# Patient Record
Sex: FEMALE | Race: WHITE | Employment: OTHER | URBAN - METROPOLITAN AREA
[De-identification: names, ages, dates, MRNs, and addresses within clinical notes are randomized per-mention and may not be internally consistent; named-entity substitution may affect disease eponyms.]

---

## 2024-11-24 ENCOUNTER — HOSPITAL ENCOUNTER (OUTPATIENT)
Age: 74
Discharge: OTHER INSTITUTION | End: 2024-11-24
Payer: MEDICARE

## 2024-11-24 VITALS
TEMPERATURE: 98 F | OXYGEN SATURATION: 99 % | SYSTOLIC BLOOD PRESSURE: 134 MMHG | RESPIRATION RATE: 20 BRPM | HEART RATE: 86 BPM | DIASTOLIC BLOOD PRESSURE: 65 MMHG

## 2024-11-24 DIAGNOSIS — R11.2 NAUSEA AND VOMITING, UNSPECIFIED VOMITING TYPE: ICD-10-CM

## 2024-11-24 DIAGNOSIS — R07.9 CHEST PAIN OF UNCERTAIN ETIOLOGY: Primary | ICD-10-CM

## 2024-11-24 PROCEDURE — 99205 OFFICE O/P NEW HI 60 MIN: CPT | Performed by: NURSE PRACTITIONER

## 2024-11-24 RX ORDER — ASPIRIN 81 MG/1
1 TABLET ORAL DAILY
COMMUNITY
Start: 2022-01-31

## 2024-11-24 RX ORDER — LISINOPRIL 20 MG/1
1 TABLET ORAL DAILY
COMMUNITY
Start: 2023-05-22

## 2024-11-24 RX ORDER — EZETIMIBE 10 MG/1
1 TABLET ORAL DAILY
COMMUNITY
Start: 2024-01-05

## 2024-11-24 RX ORDER — KETOROLAC TROMETHAMINE 5 MG/ML
SOLUTION OPHTHALMIC
COMMUNITY

## 2024-11-24 RX ORDER — OFLOXACIN 3 MG/ML
SOLUTION/ DROPS OPHTHALMIC
COMMUNITY
Start: 2024-10-02

## 2024-11-24 RX ORDER — MULTIVITAMIN
1 TABLET ORAL DAILY
COMMUNITY

## 2024-11-24 RX ORDER — ACETAMINOPHEN 500 MG
TABLET ORAL
COMMUNITY
Start: 2024-04-29

## 2024-11-24 RX ORDER — IPRATROPIUM BROMIDE 21 UG/1
1 SPRAY, METERED NASAL
COMMUNITY
Start: 2023-03-20

## 2024-11-24 RX ORDER — PREDNISOLONE ACETATE 10 MG/ML
SUSPENSION/ DROPS OPHTHALMIC
COMMUNITY

## 2024-11-24 RX ORDER — CLOPIDOGREL BISULFATE 75 MG/1
75 TABLET ORAL DAILY
COMMUNITY
Start: 2024-03-06

## 2024-11-24 RX ORDER — FERROUS SULFATE 325(65) MG
1 TABLET ORAL DAILY
COMMUNITY
Start: 2024-04-29

## 2024-11-24 RX ORDER — SERTRALINE HYDROCHLORIDE 100 MG/1
2 TABLET, FILM COATED ORAL DAILY
COMMUNITY
Start: 2023-07-30

## 2024-11-24 RX ORDER — ROSUVASTATIN CALCIUM 40 MG/1
1 TABLET, COATED ORAL NIGHTLY
COMMUNITY
Start: 2023-06-14

## 2024-11-24 RX ORDER — PANTOPRAZOLE SODIUM 20 MG/1
1 TABLET, DELAYED RELEASE ORAL DAILY
COMMUNITY
Start: 2023-07-14

## 2024-11-24 NOTE — ED INITIAL ASSESSMENT (HPI)
Pt here with complaints of acid reflux that worsened 3 days ago pt states she hs been feeling nauseous after ever meal pt states she had 2 episodes of emesis , pt states today she started having left ear pain and sore throat today, pt denies any fevers or sob

## 2024-11-24 NOTE — ED PROVIDER NOTES
Patient Seen in: [facility]      History     Chief Complaint   Patient presents with    Gastro-esophageal Reflux    Ear Problem Pain     Stated Complaint: Acid Reflex; Ear Pain    Subjective:   Well-appearing 74-year-old female with systolic congestive and diastolic congestive heart failure, osteoporosis, chronic heart failure with ejection fraction, coronary artery disease, gastroesophageal reflux, hyperlipidemia, type 2 diabetes, hypertension, multiple sclerosis, and a history of a subdural hemorrhage and cardiac arrest presents with complaints of worsening acid reflux for the past 3 days.  Patient communicates nausea after each meal, with several episodes of emesis.  Patient denies abdominal pain.  Patient also communicates that she woke up with left ear pain and a sore throat today.  Patient denies shortness of breath.  Patient denies fever or chills.  Patient denies blood in emesis.                  Objective:     History reviewed. No pertinent past medical history.           History reviewed. No pertinent surgical history.             Social History     Socioeconomic History    Marital status:    Tobacco Use    Smoking status: Never    Smokeless tobacco: Never   Vaping Use    Vaping status: Never Used   Substance and Sexual Activity    Alcohol use: Never    Drug use: Never     Social Drivers of Health     Food Insecurity: No Food Insecurity (3/11/2024)    Received from [facility]    Food Insecurity     Currently or in the past 3 months, have you worried your food would run out before you had money to buy more?: No     In the past 12 months, have you run out of food or been unable to get more?: No   Transportation Needs: Unmet Transportation Needs (3/11/2024)    Received from [facility]    Transportation Needs     Medical Transportation Needs?: Yes    Received from [facility]    Social Connections    Received from [facility]  [facility] St[facility]              Review of Systems    Positive for stated complaint: Acid Reflex; Ear Pain  Other systems are as noted in HPI.  Constitutional and vital signs reviewed.      All other systems reviewed and negative except as noted above.    Physical Exam     ED Triage Vitals [11/24/24 1249]   /65   Pulse 86   Resp 20   Temp 97.7 °F (36.5 °C)   Temp src Temporal   SpO2 99 %   O2 Device None (Room air)       Current Vitals:   Vital Signs  BP: 134/65  Pulse: 86  Resp: 20  Temp: 97.7 °F (36.5 °C)  Temp src: Temporal    Oxygen Therapy  SpO2: 99 %  O2 Device: None (Room air)        Physical Exam  VS: Vital signs reviewed. 02 saturation within normal limits for this patient.    General: Patient is awake and alert, oriented to person, place and time. Pt appears non-toxic.     HEENT: Head is normocephalic, atraumatic. Nonicteric sclera, no conjunctival injection. No facial droop or slurred speech. No oral lesions or pallor. Mucous membranes moist.      Right Ear: Tympanic membrane, ear canal and external ear normal.      Left Ear: Tympanic membrane, ear canal and external ear normal.     Neck: No cervical lymphadenopathy. Supple. Normal ROM.    Heart: Regular rate and rhythm, normal S1, normal S2.    Lungs: Clear to auscultation. Good inspiratory effort. + Airway entry bilaterally without wheezes, rhonchi or crackles. No accessory muscle use or tachypnea.    Abdomen: Soft, nontender, non-distended.    Extremities: No focal swelling or tenderness. Capillary refill noted.     Skin: Warm, dry and normal in color.     Psychiatric: Normal affect, judgement normal, insight normal.     CNS: Moves all 4 extremities. Interacts appropriately. No gait abnormality. Memory normal.        ED Course   Labs Reviewed - No data to display  EKG    Rate, intervals and axes as noted on EKG Report.  Rate: 81  Rhythm: Sinus Rhythm  Reading: NO ST elevation            MDM   Medical Decision Making  Patient is  well-appearing.  Patient communicates anterior chest discomfort, attributes this to possible acid reflux. Abd benign.   Patient has an extensive cardiac history, including cardiac arrest.  EKG abnormal.     It was my recommendation the patient go to the nearest ER for further evaluation and management of symptoms.  Patient walked to clinic, we discussed EMS transport, patient declined.  Patient will be taking an [facility] to [facility] ER as this is the closest ER.  I discussed with patient that time is important, as a delay in care can lead to worsening symptoms, disability or even death, patient verbalized understanding.    Problems Addressed:  Chest pain of uncertain etiology: acute illness or injury  Nausea and vomiting, unspecified vomiting type: acute illness or injury    Amount and/or Complexity of Data Reviewed  External Data Reviewed: notes.     Details: ER visit from November 2021 reviewed        Disposition and Plan     Clinical Impression:  1. Chest pain of uncertain etiology    2. Nausea and vomiting, unspecified vomiting type         Disposition:  Ic to ed  11/24/2024  1:48 pm    Follow-up:  No follow-up provider specified.        Medications Prescribed:  Discharge Medication List as of 11/24/2024  1:52 PM              Supplementary Documentation:

## 2024-11-25 LAB
ATRIAL RATE: 81 BPM
P AXIS: 22 DEGREES
P-R INTERVAL: 152 MS
Q-T INTERVAL: 412 MS
QRS DURATION: 158 MS
QTC CALCULATION (BEZET): 478 MS
R AXIS: -41 DEGREES
T AXIS: 88 DEGREES
VENTRICULAR RATE: 81 BPM